# Patient Record
(demographics unavailable — no encounter records)

---

## 2025-04-30 NOTE — REVIEW OF SYSTEMS
[Hoarseness] : hoarseness [Insomnia] : insomnia [Negative] : Heme/Lymph [Earache] : no earache [Hearing Loss] : no hearing loss [Nosebleed] : no nosebleeds [Nasal Discharge] : no nasal discharge [Sore Throat] : no sore throat [Postnasal Drip] : no postnasal drip [Suicidal] : not suicidal [Anxiety] : no anxiety [Depression] : no depression

## 2025-04-30 NOTE — PHYSICAL EXAM
[No Acute Distress] : no acute distress [Well Nourished] : well nourished [Well Developed] : well developed [Well-Appearing] : well-appearing [Normal Sclera/Conjunctiva] : normal sclera/conjunctiva [PERRL] : pupils equal round and reactive to light [EOMI] : extraocular movements intact [Normal Outer Ear/Nose] : the outer ears and nose were normal in appearance [Normal Oropharynx] : the oropharynx was normal [No JVD] : no jugular venous distention [No Lymphadenopathy] : no lymphadenopathy [Supple] : supple [Thyroid Normal, No Nodules] : the thyroid was normal and there were no nodules present [No Respiratory Distress] : no respiratory distress  [No Accessory Muscle Use] : no accessory muscle use [Clear to Auscultation] : lungs were clear to auscultation bilaterally [Normal Rate] : normal rate  [Normal S1, S2] : normal S1 and S2 [No Murmur] : no murmur heard [No Carotid Bruits] : no carotid bruits [No Abdominal Bruit] : a ~M bruit was not heard ~T in the abdomen [No Varicosities] : no varicosities [Pedal Pulses Present] : the pedal pulses are present [No Edema] : there was no peripheral edema [No Palpable Aorta] : no palpable aorta [No Extremity Clubbing/Cyanosis] : no extremity clubbing/cyanosis [Soft] : abdomen soft [Non Tender] : non-tender [Non-distended] : non-distended [No Masses] : no abdominal mass palpated [No HSM] : no HSM [Normal Bowel Sounds] : normal bowel sounds [Normal Posterior Cervical Nodes] : no posterior cervical lymphadenopathy [Normal Anterior Cervical Nodes] : no anterior cervical lymphadenopathy [No CVA Tenderness] : no CVA  tenderness [No Spinal Tenderness] : no spinal tenderness [No Joint Swelling] : no joint swelling [Grossly Normal Strength/Tone] : grossly normal strength/tone [No Rash] : no rash [Coordination Grossly Intact] : coordination grossly intact [No Focal Deficits] : no focal deficits [Normal Gait] : normal gait [Deep Tendon Reflexes (DTR)] : deep tendon reflexes were 2+ and symmetric [Normal Affect] : the affect was normal [Normal Insight/Judgement] : insight and judgment were intact [de-identified] : hoarse voice [de-identified] : irregular rhythm with ectopic beats

## 2025-04-30 NOTE — HISTORY OF PRESENT ILLNESS
[FreeTextEntry1] : EMBER [de-identified] : Patient is an 83-year-old female with PMH BCC, DM2, HLD, HTN, Crohn's and UC, GERD, endometrial cancer s/p TLH-BSO presenting for AWV. Patient's previous PCP was Dr. Nielson. Patient reports she lost her  2 years ago and initially she felt numb for the first year, but she is grieving more significantly this year. She has a good support system from her family who live with her. She does not want any treatment to help with her grief. Patient reports her DM2 is managed with metformin 500mg BID. She checks her BG daily and it used to be below 120, but now its between 120-140 since November. She has been eating more sweets due to her grief, which she attributes her elevated BG levels to. Patient reports she was previously on ramipril 10mg for HTN, but she stopped it. She is still taking carvedilol 6.25mg BID. She has never seen a cardiologist. She also takes simvastatin 10mg and omeprazole 20mg. She has glaucoma and follows with ophthalmology. She is currently taking Combigan, Travatan Z, and Rhapressa eye drops. Patient reports she frequently gets up at 3am and has trouble falling back asleep. She currently ambulates with a cane. No falls in the last year.  Last mammo- aged out Last DEXA- never Last pap smear- s/p AURELIA-BSO Last colonoscopy- aged out Last eye exam- 04/25 Last skin cancer screening- a few years ago RSV vaccine- already received [id number]- already received Shingles vaccine- already received Tdap vaccine- > 10 years

## 2025-04-30 NOTE — PLAN
[FreeTextEntry1] : HTN: - Chronic, exacerbated - BP elevated in setting of self-d/c of ramipril - Recommend adding ramipril back at 2.5mg and will uptitrate as needed - Recommend patient check BP at home and notify office for BP > 140/90 - Low salt diet - Carvedilol renewed today  Abnormal heart rhythm: - Ectopic beats noted on auscultation - Cardiology referral placed today for baseline evaluation - Patient is on carvedilol - No ectopic beats captured on EKG today - Recommend Holter monitor, TTE, and pharmacologic stress test for baseline evaluation  HLD: - Chronic, stable - Managed with simvastatin 10mg. Renewed at today's visit - Recommend low fat diet - Cardiology referral as above for baseline testing - Fasting lipid panel collected and sent today  DM2: - Chronic, stable - BG levels have been elevated in setting of increased carb consumption - Recommend low carb diet - Goal A1C < 7% - A1C collected and sent today - Continue daily fingersticks - Continue metformin 500mg BID. Will adjust dose as needed  GERD: - Chronic, stable - Managed with omeprazole - Omeprazole renewed today  HCM: - Routine labs/urine collected and sent today - EKG NSR with age indeterminate anteroseptal infarct - Aged out of mammo, colonoscopy - DEXA ordered today - Next eye exam- 08/2025 - Dermatology referral placed for skin cancer screening - Patient already received RSV, PNA, and shingles vaccine - Patient to come back or go to local pharmacy for Tdap vaccine as last vaccination > 10 years ago  f/u 3 months

## 2025-04-30 NOTE — HEALTH RISK ASSESSMENT
[With Family] : lives with family [Retired] : retired [] :  [Sexually Active] : sexually active [Feels Safe at Home] : Feels safe at home [Fully functional (bathing, dressing, toileting, transferring, walking, feeding)] : Fully functional (bathing, dressing, toileting, transferring, walking, feeding) [Fully functional (using the telephone, shopping, preparing meals, housekeeping, doing laundry, using] : Fully functional and needs no help or supervision to perform IADLs (using the telephone, shopping, preparing meals, housekeeping, doing laundry, using transportation, managing medications and managing finances) [Reports normal functional visual acuity (ie: able to read med bottle)] : Reports normal functional visual acuity [Smoke Detector] : smoke detector [Carbon Monoxide Detector] : carbon monoxide detector [Safety elements used in home] : safety elements used in home [Seat Belt] :  uses seat belt [Sunscreen] : uses sunscreen [Good] : ~his/her~  mood as  good [Monthly or less (1 pt)] : Monthly or less (1 point) [1 or 2 (0 pts)] : 1 or 2 (0 points) [Never (0 pts)] : Never (0 points) [No] : In the past 12 months have you used drugs other than those required for medical reasons? No [No falls in past year] : Patient reported no falls in the past year [0] : 1) Little interest or pleasure doing things: Not at all (0) [1] : 2) Feeling down, depressed, or hopeless for several days (1) [PHQ-2 Negative - No further assessment needed] : PHQ-2 Negative - No further assessment needed [Never] : Never [Yes] : takes [NO] : No [No Retinopathy] : No retinopathy [HIV test declined] : HIV test declined [Hepatitis C test declined] : Hepatitis C test declined [With Patient/Caregiver] : , with patient/caregiver [Reviewed no changes] : Reviewed, no changes [Audit-CScore] : 1 [de-identified] : goes up and down the stairs daily, walks her dog [de-identified] : has been eating more sweets. Eats Chinese food when stomach is bothering her [[id number]] : 1 [EyeExamDate] : 04/25 [Change in mental status noted] : No change in mental status noted [High Risk Behavior] : no high risk behavior [Reports changes in hearing] : Reports no changes in hearing [Reports changes in vision] : Reports no changes in vision [Reports changes in dental health] : Reports no changes in dental health [Travel to Developing Areas] : does not  travel to developing areas [TB Exposure] : is not being exposed to tuberculosis [Caregiver Concerns] : does not have caregiver concerns [MammogramDate] : aged out [PapSmearDate] : aged out [BoneDensityDate] : never [ColonoscopyDate] : aged out [FreeTextEntry2] : retired, worked for [employer] [AdvancecareDate] : 04/25

## 2025-07-30 NOTE — HISTORY OF PRESENT ILLNESS
[FreeTextEntry1] : Medication follow up. [de-identified] : Patient is an 83-year-old female with PMH BCC, DM2, HLD, HTN, Crohn's and UC, GERD, endometrial cancer s/p TLH-BSO presenting for a 3-month follow-up. At last visit, patient was restarted on ramipril 2.5 after it was self-discontinued. She has been taking ramipril as prescribed. She does not check BPs at home. She reports she believes her BPs are elevated in the office due to anxiety in doctor's offices. She was also referred to cardiology as ectopic beats were noted on auscultation, however patient has not seen cardiology yet. EKG was NSR on last visit. Her diabetes is controlled with metformin 500mg BID. She checks her fingersticks at home and states this helps her keep diabetes controlled as she can see which foods cause spikes in BG levels.

## 2025-07-30 NOTE — PLAN
[FreeTextEntry1] : HTN: - Chronic, improving - BP is better controlled today on ramipril 2.5mg, but still elevated - Recommend continuing current regimen but advised patient to purchase home BP cuff and check BPs at home. Patient should keep log of BPs and bring log and cuff to next visit - Low salt diet - Recommend f/u with cardiology as recommended last visit. No ectopic beats noted on exam today - CMP collected and sent today  DM2: - Chronic, stable - Managed with metformin 500mg BID - Last A1C 6.0%, goal A1C < 7.0% - Recommend low carb diet - Continue checking BG levels at home - A1C collected and sent today  f/u 3 months